# Patient Record
Sex: FEMALE | Race: WHITE
[De-identification: names, ages, dates, MRNs, and addresses within clinical notes are randomized per-mention and may not be internally consistent; named-entity substitution may affect disease eponyms.]

---

## 2023-01-21 ENCOUNTER — HOSPITAL ENCOUNTER (EMERGENCY)
Dept: HOSPITAL 33 - ED | Age: 38
Discharge: HOME | End: 2023-01-21
Payer: COMMERCIAL

## 2023-01-21 VITALS — OXYGEN SATURATION: 94 % | DIASTOLIC BLOOD PRESSURE: 62 MMHG | HEART RATE: 56 BPM | SYSTOLIC BLOOD PRESSURE: 118 MMHG

## 2023-01-21 DIAGNOSIS — Z87.442: ICD-10-CM

## 2023-01-21 DIAGNOSIS — N23: Primary | ICD-10-CM

## 2023-01-21 DIAGNOSIS — Z72.0: ICD-10-CM

## 2023-01-21 DIAGNOSIS — Z28.310: ICD-10-CM

## 2023-01-21 DIAGNOSIS — E11.9: ICD-10-CM

## 2023-01-21 LAB
ALBUMIN SERPL-MCNC: 4.1 G/DL (ref 3.5–5)
ALP SERPL-CCNC: 68 U/L (ref 38–126)
ALT SERPL-CCNC: 18 U/L (ref 0–35)
AMYLASE SERPL-CCNC: 70 U/L (ref 30–110)
ANION GAP SERPL CALC-SCNC: 10.8 MEQ/L (ref 5–15)
AST SERPL QL: 19 U/L (ref 14–36)
BACTERIA UR CULT: NO
BASOPHILS # BLD AUTO: 0.06 X10^3/UL (ref 0–0.4)
BASOPHILS NFR BLD AUTO: 1 % (ref 0–0.4)
BILIRUB BLD-MCNC: 0.2 MG/DL (ref 0.2–1.3)
BUN SERPL-MCNC: 13 MG/DL (ref 7–17)
CALCIUM SPEC-MCNC: 8.9 MG/DL (ref 8.4–10.2)
CHLORIDE SERPL-SCNC: 105 MMOL/L (ref 98–107)
CO2 SERPL-SCNC: 27 MMOL/L (ref 22–30)
CREAT SERPL-MCNC: 0.79 MG/DL (ref 0.52–1.04)
EOSINOPHIL # BLD AUTO: 0.19 X10^3/UL (ref 0–0.5)
GFR SERPLBLD BASED ON 1.73 SQ M-ARVRAT: > 60 ML/MIN
GLUCOSE SERPL-MCNC: 136 MG/DL (ref 74–106)
GLUCOSE UR-MCNC: 100 MG/DL
HCT VFR BLD AUTO: 39.4 % (ref 35–47)
HGB BLD-MCNC: 12.8 G/DL (ref 12–16)
IMM GRANULOCYTES # BLD: 0.02 X10^3U/L (ref 0–0.03)
IMM GRANULOCYTES NFR BLD: 0.3 % (ref 0–0.4)
LIPASE SERPL-CCNC: 194 U/L (ref 23–300)
LYMPHOCYTES # SPEC AUTO: 2.51 X10^3/UL (ref 1–4.6)
MCH RBC QN AUTO: 29.2 PG (ref 26–32)
MCHC RBC AUTO-ENTMCNC: 32.5 G/DL (ref 32–36)
MONOCYTES # BLD AUTO: 0.39 X10^3/UL (ref 0–1.3)
NRBC # BLD AUTO: 0 X10^3U/L (ref 0–0.01)
NRBC BLD AUTO-RTO: 0 % (ref 0–0.1)
PLATELET # BLD AUTO: 219 X10^3/UL (ref 150–450)
POTASSIUM SERPLBLD-SCNC: 4.3 MMOL/L (ref 3.5–5.1)
PROT SERPL-MCNC: 7 G/DL (ref 6.3–8.2)
RBC # BLD AUTO: 4.38 X10^6/UL (ref 4.1–5.4)
RBC # URNS HPF: (no result) /HPF (ref 0–5)
SODIUM SERPL-SCNC: 138 MMOL/L (ref 137–145)
WBC # BLD AUTO: 6.3 X10^3/UL (ref 4–10.5)
WBC URNS QL MICRO: (no result) /HPF (ref 0–5)

## 2023-01-21 PROCEDURE — 83605 ASSAY OF LACTIC ACID: CPT

## 2023-01-21 PROCEDURE — 82150 ASSAY OF AMYLASE: CPT

## 2023-01-21 PROCEDURE — 74176 CT ABD & PELVIS W/O CONTRAST: CPT

## 2023-01-21 PROCEDURE — 85025 COMPLETE CBC W/AUTO DIFF WBC: CPT

## 2023-01-21 PROCEDURE — 99284 EMERGENCY DEPT VISIT MOD MDM: CPT

## 2023-01-21 PROCEDURE — 81001 URINALYSIS AUTO W/SCOPE: CPT

## 2023-01-21 PROCEDURE — 36415 COLL VENOUS BLD VENIPUNCTURE: CPT

## 2023-01-21 PROCEDURE — 83690 ASSAY OF LIPASE: CPT

## 2023-01-21 PROCEDURE — 81025 URINE PREGNANCY TEST: CPT

## 2023-01-21 PROCEDURE — 96374 THER/PROPH/DIAG INJ IV PUSH: CPT

## 2023-01-21 PROCEDURE — 80053 COMPREHEN METABOLIC PANEL: CPT

## 2023-01-21 PROCEDURE — 96375 TX/PRO/DX INJ NEW DRUG ADDON: CPT

## 2023-01-21 PROCEDURE — 36000 PLACE NEEDLE IN VEIN: CPT

## 2023-01-21 NOTE — ERPHSYRPT
- History of Present Illness


Time Seen by Provider: 23 21:50


Historian: patient


Exam Limitations: no limitations


Patient Subjective Stated Complaint: pt arrived with  from the custodial, pt 

states she began to have abdominal pain today at 1200. states pain is consistant

in her lower right abdominal area and radiates to her back


Triage Nursing Assessment: pt alert and oriented. laying in bed. holding right 

side of abdomen.


Physician History: 





Patient is a 37-year-old white female inmate of the local custodial who presents with

a complaint of right lower quadrant pain which radiates from the right flank 

which has been present since 11:00 today or approximately 12 hours.  She has had

a previous appendectomy and cholecystectomy she also has a history of renal 

stones in the past.


Timing/Duration: today


Activities at Onset: none


Quality: cramping, stabbing


Abdominal Pain Onset Location: flank (Right)


Pain Radiation: RLQ


Severity of Pain-Max: severe


Severity of Pain-Current: moderate


Modifying Factors: Improves With: nothing


Previous symptoms: same symptoms as today


Allergies/Adverse Reactions: 








No Known Drug Allergies Allergy (Unverified 23 22:02)


   





Immunizations Up to Date: Yes





Travel Risk





- International Travel


Have you traveled outside of the country in past 3 weeks: No





- Coronavirus Screening


Are you exhibiting any of the following symptoms?: No


Close contact with a COVID-19 positive Pt in past 14-21 Days: No





- Vaccine Status


Have you recieved a Covid-19 vaccination: No





- Review of Systems


Constitutional: No Fever, No Chills


Eyes: No Symptoms


Ears, Nose, & Throat: No Symptoms


Respiratory: No Cough, No Dyspnea


Cardiac: No Chest Pain, No Edema, No Syncope


Abdominal/Gastrointestinal: No Abdominal Pain, No Nausea, No Vomiting, No 

Diarrhea


Genitourinary Symptoms: Flank Pain, No Dysuria


Musculoskeletal: No Back Pain, No Neck Pain


Skin: No Rash


Neurological: No Dizziness, No Focal Weakness, No Sensory Changes


Psychological: No Symptoms


Endocrine: No Symptoms


All Other Systems: Reviewed and Negative





- Past Medical History


Pertinent Past Medical History: Yes


Endocrine Medical History: Diabetes Type II


GI Medical History: Diverticulitis, Gallbladder Disease


Other Medical History: kidney stones





- Past Surgical History


Past Surgical History: Yes


Gastrointestinal: Appendectomy, Cholecystectomy


Other Surgical History: mirena placed,   x2





- Social History


Smoking Status: Light tobacco smoker


Drug Use: none





- Female History


Hx Last Menstrual Period: unknown


Hx Pregnant Now: No





- Nursing Vital Signs


Nursing Vital Signs: 





                               Initial Vital Signs











Pulse Rate  78   23 21:47


 


Respiratory Rate  18   23 21:47


 


Blood Pressure  156/86   23 21:47


 


O2 Sat by Pulse Oximetry  96   23 21:47








                                   Pain Scale











Pain Intensity                 3

















- Physical Exam


General Appearance: no apparent distress, alert


Eye Exam: PERRL/EOMI, eyes nml inspection


Ears, Nose, Throat Exam: normal ENT inspection, pharynx normal, moist mucous 

membranes


Neck Exam: normal inspection, non-tender, supple, full range of motion


Respiratory Exam: normal breath sounds, lungs clear, No respiratory distress


Cardiovascular Exam: regular rate/rhythm, normal heart sounds


Gastrointestinal/Abdomen Exam: soft, No tenderness, No mass


Back Exam: normal inspection, normal range of motion, CVA tenderness (Right CVA 

tenderness), No vertebral tenderness


Extremity Exam: normal inspection, normal range of motion, pelvis stable


Neurologic Exam: alert, oriented x 3, cooperative, normal mood/affect, nml 

cerebellar function, sensation nml, No motor deficits


Skin Exam: normal color, warm, dry


SpO2: 94





- Course


Nursing assessment & vital signs reviewed: Yes





- CT Exams


  ** Abdomen/Pelvis


CT Interpretation: Negative


Ordered Tests: 





                               Active Orders 24 hr











 Category Date Time Status


 


 IV Insertion STAT Care  23 22:04 Ordered


 


 ABDOMEN AND PELVIS W/0 CONTRAS [CT] Stat Exams  23 22:10 Taken


 


 AMYLASE Stat Lab  23 22:04 Ordered


 


 CBC W DIFF Stat Lab  23 22:04 Ordered


 


 CMP Stat Lab  23 22:04 Ordered


 


 HCG,QUALITATIVE URINE Stat Lab  23 22:06 Completed


 


 LIPASE Stat Lab  23 22:04 Ordered


 


 Lactic Acid Stat Lab  23 22:04 Ordered


 


 UA W/RFX UR CULTURE Stat Lab  23 22:04 Ordered








Medication Summary














Discontinued Medications














Generic Name Dose Route Start Last Admin





  Trade Name Freq  PRN Reason Stop Dose Admin


 


Hydromorphone HCl  1 mg  23 22:04  23 22:12





  Hydromorphone 1 Mg/1ml Inj*** 1 Mg/Ml Syringe  IV  23 22:05  1 mg





  STAT ONE   Administration


 


Hydromorphone HCl  Confirm  23 22:08 





  Hydromorphone 1 Mg/1ml Inj*** 1 Mg/Ml Syringe  Administered  23 22:09 





  Dose  





  1 mg  





  .ROUTE  





  .STK-MED ONE  


 


Sodium Chloride  1,000 mls @ 999 mls/hr  23 22:04  23 22:11





  Sodium Chloride 0.9% 1000 Ml  IV  23 23:04  999 mls/hr





  .Q1H1M STA   Administration


 


Sodium Chloride  Confirm  23 22:08 





  Sodium Chloride 0.9% 1000 Ml  Administered  23 22:09 





  Dose  





  1,000 mls @ ud  





  .ROUTE  





  .STK-MED ONE  


 


Ketorolac Tromethamine  30 mg  23 22:04  23 22:11





  Ketorolac Tromethamine 30 Mg/Ml Inj  IV  23 22:05  30 mg





  STAT ONE   Administration


 


Ketorolac Tromethamine  Confirm  23 22:07 





  Ketorolac Tromethamine 30 Mg/Ml Inj  Administered  23 22:08 





  Dose  





  30 mg  





  .ROUTE  





  .STK-MED ONE  


 


Metoclopramide HCl  10 mg  23 22:04  23 22:11





  Metoclopramide Hcl 10 Mg/2 Ml Vial  IV  23 22:05  10 mg





  STAT ONE   Administration


 


Metoclopramide HCl  Confirm  23 22:08 





  Metoclopramide Hcl 10 Mg/2 Ml Vial  Administered  23 22:09 





  Dose  





  10 mg  





  .ROUTE  





  .STK-MED ONE  











Lab/Rad Data: 





                           Laboratory Result Diagrams





                                 23 22:10 





                                 23 22:10 





                               Laboratory Results











  23 Range/Units





  22:24 22:10 22:10 


 


WBC    6.3  (4.0-10.5)  x10^3/uL


 


RBC    4.38  (4.1-5.4)  x10^6/uL


 


Hgb    12.8  (12.0-16.0)  g/dL


 


Hct    39.4  (35-47)  %


 


MCV    90.0  ()  fL


 


MCH    29.2  (26-32)  pg


 


MCHC    32.5  (32-36)  g/dL


 


RDW    13.4  (11.5-14.0)  %


 


Plt Count    219  (150-450)  x10^3/uL


 


MPV    11.5 H  (7.5-11.0)  fL


 


Gran %    49.7  (36.0-66.0)  %


 


Immature Gran % (Auto)    0.3  (0.00-0.4)  %


 


Nucleat RBC Rel Count    0.0  (0.00-0.1)  %


 


Eos # (Auto)    0.19  (0-0.5)  x10^3/uL


 


Immature Gran # (Auto)    0.02  (0.00-0.03)  x10^3u/L


 


Absolute Lymphs (auto)    2.51  (1.0-4.6)  x10^3/uL


 


Absolute Monos (auto)    0.39  (0.0-1.3)  x10^3/uL


 


Absolute Nucleated RBC    0.00  (0.00-0.01)  x10^3u/L


 


Lymphocytes %    39.8  (24.0-44.0)  %


 


Monocytes %    6.2  (0.0-12.0)  %


 


Eosinophils %    3.0  (0.00-5.0)  %


 


Basophils %    1.0  (0.0-0.4)  %


 


Absolute Granulocytes    3.14  (1.4-6.9)  x10^3/uL


 


Basophils #    0.06  (0-0.4)  x10^3/uL


 


Sodium   138   (137-145)  mmol/L


 


Potassium   4.3   (3.5-5.1)  mmol/L


 


Chloride   105   ()  mmol/L


 


Carbon Dioxide   27   (22-30)  mmol/L


 


Anion Gap   10.8   (5-15)  MEQ/L


 


BUN   13   (7-17)  mg/dL


 


Creatinine   0.79   (0.52-1.04)  mg/dL


 


Estimated GFR   > 60.0   ML/MIN


 


Glucose   136 H   ()  mg/dL


 


Lactic Acid  1.2    (0.4-2.0)  


 


Calcium   8.9   (8.4-10.2)  mg/dL


 


Total Bilirubin   0.20   (0.2-1.3)  mg/dL


 


AST   19   (14-36)  U/L


 


ALT   18   (0-35)  U/L


 


Alkaline Phosphatase   68   ()  U/L


 


Serum Total Protein   7.0   (6.3-8.2)  g/dL


 


Albumin   4.1   (3.5-5.0)  g/dL


 


Amylase   70   ()  U/L


 


Lipase   194   ()  U/L


 


Urine Color     (Yellow)  


 


Urine Appearance     (Clear)  


 


Urine pH     (4.6-8.0)  


 


Ur Specific Gravity     (1.005-1.030)  


 


Urine Protein     (Negative)  


 


Urine Glucose (UA)     (Negative)  mg/dL


 


Urine Ketones     (Negative)  


 


Urine Blood     (Negative)  


 


Urine Nitrite     (Negative)  


 


Urine Bilirubin     (Negative)  


 


Urine Urobilinogen     (0.2)  mg/dL


 


Ur Leukocyte Esterase     (Negative)  


 


U Hyaline Cast (Auto)     (0-2)  /LPF


 


Urine Microscopic RBC     (0-5)  /HPF


 


Urine Microscopic WBC     (0-5)  /HPF


 


Ur Epithelial Cells     (None Seen)  /HPF


 


Urine Bacteria     (None Seen)  /HPF


 


Urine Culture Reflexed     (NO)  


 


Urine HCG, Qual     (Negative)  














  23 Range/Units





  22:06 22:06 


 


WBC    (4.0-10.5)  x10^3/uL


 


RBC    (4.1-5.4)  x10^6/uL


 


Hgb    (12.0-16.0)  g/dL


 


Hct    (35-47)  %


 


MCV    ()  fL


 


MCH    (26-32)  pg


 


MCHC    (32-36)  g/dL


 


RDW    (11.5-14.0)  %


 


Plt Count    (150-450)  x10^3/uL


 


MPV    (7.5-11.0)  fL


 


Gran %    (36.0-66.0)  %


 


Immature Gran % (Auto)    (0.00-0.4)  %


 


Nucleat RBC Rel Count    (0.00-0.1)  %


 


Eos # (Auto)    (0-0.5)  x10^3/uL


 


Immature Gran # (Auto)    (0.00-0.03)  x10^3u/L


 


Absolute Lymphs (auto)    (1.0-4.6)  x10^3/uL


 


Absolute Monos (auto)    (0.0-1.3)  x10^3/uL


 


Absolute Nucleated RBC    (0.00-0.01)  x10^3u/L


 


Lymphocytes %    (24.0-44.0)  %


 


Monocytes %    (0.0-12.0)  %


 


Eosinophils %    (0.00-5.0)  %


 


Basophils %    (0.0-0.4)  %


 


Absolute Granulocytes    (1.4-6.9)  x10^3/uL


 


Basophils #    (0-0.4)  x10^3/uL


 


Sodium    (137-145)  mmol/L


 


Potassium    (3.5-5.1)  mmol/L


 


Chloride    ()  mmol/L


 


Carbon Dioxide    (22-30)  mmol/L


 


Anion Gap    (5-15)  MEQ/L


 


BUN    (7-17)  mg/dL


 


Creatinine    (0.52-1.04)  mg/dL


 


Estimated GFR    ML/MIN


 


Glucose    ()  mg/dL


 


Lactic Acid    (0.4-2.0)  


 


Calcium    (8.4-10.2)  mg/dL


 


Total Bilirubin    (0.2-1.3)  mg/dL


 


AST    (14-36)  U/L


 


ALT    (0-35)  U/L


 


Alkaline Phosphatase    ()  U/L


 


Serum Total Protein    (6.3-8.2)  g/dL


 


Albumin    (3.5-5.0)  g/dL


 


Amylase    ()  U/L


 


Lipase    ()  U/L


 


Urine Color   Yellow  (Yellow)  


 


Urine Appearance   Clear  (Clear)  


 


Urine pH   6.5  (4.6-8.0)  


 


Ur Specific Gravity   1.020  (1.005-1.030)  


 


Urine Protein   Negative  (Negative)  


 


Urine Glucose (UA)   100 A  (Negative)  mg/dL


 


Urine Ketones   Negative  (Negative)  


 


Urine Blood   Moderate A  (Negative)  


 


Urine Nitrite   Negative  (Negative)  


 


Urine Bilirubin   Negative  (Negative)  


 


Urine Urobilinogen   0.2  (0.2)  mg/dL


 


Ur Leukocyte Esterase   Negative  (Negative)  


 


U Hyaline Cast (Auto)   NONE SEEN  (0-2)  /LPF


 


Urine Microscopic RBC   0-2  (0-5)  /HPF


 


Urine Microscopic WBC   0-2  (0-5)  /HPF


 


Ur Epithelial Cells   Rare  (None Seen)  /HPF


 


Urine Bacteria   Rare A  (None Seen)  /HPF


 


Urine Culture Reflexed   NO  (NO)  


 


Urine HCG, Qual  NEGATIVE   (Negative)  














- Progress


Progress: unchanged





- Departure


Departure Disposition: [facility] care


Clinical Impression: 


 Renal colic





Condition: Stable


Critical Care Time: No


Referrals: 


[facility] [Primary Care Provider] - Follow up/PCP as directed


Instructions:  Renal Colic (DC)


Prescriptions: 


Tamsulosin HCl 0.4 mg*** [Flomax 0.4 MG***] 0.4 mg PO DAILY 3 Days #3 cap Muscle Hinge Flap Text: The defect edges were debeveled with a #15 scalpel blade.  Given the size, depth and location of the defect and the proximity to free margins a muscle hinge flap was deemed most appropriate.  Using a sterile surgical marker, an appropriate hinge flap was drawn incorporating the defect. The area thus outlined was incised with a #15 scalpel blade.  The skin margins were undermined to an appropriate distance in all directions utilizing iris scissors.

## 2023-01-22 NOTE — XRAY
Indication: Right lower quadrant/pelvic pain.



Multiple contiguous axial images obtained through the abdomen and pelvis

without contrast.



Comparison: None



Lung bases demonstrates minimal fibrosis/scarring.  Heart not enlarged.



Stomach distended with food/fluid.  Stomach and bowel loops appear

nonobstructed.  There is mild diffuse scattered colonic fecal debris

throughout and scattered descending/sigmoid diverticulosis without

diverticulitis.  Previous appendectomy and cholecystectomy.  Uterus

demonstrates IUD in situ.  No free fluid/air.  14 cm splenomegaly.



Remaining liver, pancreas, spleen, adrenal glands, kidneys, ureters, bladder,

uterus, and aorta are unremarkable for noncontrast exam.



Osseous structures intact with minimal/mild degenerative changes throughout

thoracolumbar spine and minimal dextroscoliosis.  No ventral or inguinal

hernias.



Impression:

1.  Mild diffuse fecal stasis, colonic diverticulosis, splenomegaly, and

chronic bony findings.

2.  Remaining CT abdomen/pelvis without contrast exam is negative.



Comment: Preliminary interpretation made by C.  No critical discrepancy.